# Patient Record
Sex: FEMALE | Race: WHITE | NOT HISPANIC OR LATINO | Employment: FULL TIME | URBAN - METROPOLITAN AREA
[De-identification: names, ages, dates, MRNs, and addresses within clinical notes are randomized per-mention and may not be internally consistent; named-entity substitution may affect disease eponyms.]

---

## 2017-06-22 ENCOUNTER — HOSPITAL ENCOUNTER (EMERGENCY)
Facility: HOSPITAL | Age: 52
Discharge: HOME | End: 2017-06-22
Attending: EMERGENCY MEDICINE | Admitting: EMERGENCY MEDICINE

## 2017-06-22 ENCOUNTER — APPOINTMENT (EMERGENCY)
Dept: CT IMAGING | Facility: HOSPITAL | Age: 52
End: 2017-06-22

## 2017-06-22 VITALS
RESPIRATION RATE: 19 BRPM | SYSTOLIC BLOOD PRESSURE: 144 MMHG | HEIGHT: 66 IN | DIASTOLIC BLOOD PRESSURE: 92 MMHG | BODY MASS INDEX: 22.5 KG/M2 | TEMPERATURE: 97.9 F | OXYGEN SATURATION: 100 % | HEART RATE: 73 BPM | WEIGHT: 140 LBS

## 2017-06-22 DIAGNOSIS — S09.90XA MINOR HEAD INJURY: Primary | ICD-10-CM

## 2017-06-22 DIAGNOSIS — F07.81 CONCUSSION SYNDROME: ICD-10-CM

## 2017-06-22 PROCEDURE — 70450 CT HEAD/BRAIN W/O DYE: CPT

## 2017-06-22 PROCEDURE — 99283 EMERGENCY DEPT VISIT LOW MDM: CPT

## 2024-10-13 ENCOUNTER — APPOINTMENT (EMERGENCY)
Dept: CT IMAGING | Facility: HOSPITAL | Age: 59
End: 2024-10-13
Payer: COMMERCIAL

## 2024-10-13 ENCOUNTER — HOSPITAL ENCOUNTER (EMERGENCY)
Facility: HOSPITAL | Age: 59
Discharge: HOME | End: 2024-10-13
Attending: EMERGENCY MEDICINE
Payer: COMMERCIAL

## 2024-10-13 VITALS
DIASTOLIC BLOOD PRESSURE: 100 MMHG | SYSTOLIC BLOOD PRESSURE: 217 MMHG | OXYGEN SATURATION: 99 % | WEIGHT: 162.04 LBS | TEMPERATURE: 97.7 F | HEART RATE: 61 BPM | BODY MASS INDEX: 26.15 KG/M2 | RESPIRATION RATE: 17 BRPM

## 2024-10-13 DIAGNOSIS — R11.2 NAUSEA AND VOMITING: ICD-10-CM

## 2024-10-13 DIAGNOSIS — E87.6 HYPOKALEMIA: ICD-10-CM

## 2024-10-13 DIAGNOSIS — F19.10 DRUG ABUSE (HCC): ICD-10-CM

## 2024-10-13 DIAGNOSIS — R10.9 ABDOMINAL PAIN: Primary | ICD-10-CM

## 2024-10-13 LAB
ALBUMIN SERPL BCG-MCNC: 4.5 G/DL (ref 3.5–5)
ALP SERPL-CCNC: 77 U/L (ref 34–104)
ALT SERPL W P-5'-P-CCNC: 18 U/L (ref 7–52)
ANION GAP SERPL CALCULATED.3IONS-SCNC: 10 MMOL/L (ref 4–13)
AST SERPL W P-5'-P-CCNC: 13 U/L (ref 13–39)
ATRIAL RATE: 64 BPM
BASOPHILS # BLD AUTO: 0.03 THOUSANDS/ΜL (ref 0–0.1)
BASOPHILS NFR BLD AUTO: 0 % (ref 0–1)
BILIRUB DIRECT SERPL-MCNC: 0.11 MG/DL (ref 0–0.2)
BILIRUB SERPL-MCNC: 1.01 MG/DL (ref 0.2–1)
BUN SERPL-MCNC: 20 MG/DL (ref 5–25)
CALCIUM SERPL-MCNC: 9.3 MG/DL (ref 8.4–10.2)
CHLORIDE SERPL-SCNC: 101 MMOL/L (ref 96–108)
CO2 SERPL-SCNC: 26 MMOL/L (ref 21–32)
CREAT SERPL-MCNC: 1.05 MG/DL (ref 0.6–1.3)
EOSINOPHIL # BLD AUTO: 0.03 THOUSAND/ΜL (ref 0–0.61)
EOSINOPHIL NFR BLD AUTO: 0 % (ref 0–6)
ERYTHROCYTE [DISTWIDTH] IN BLOOD BY AUTOMATED COUNT: 12.7 % (ref 11.6–15.1)
GFR SERPL CREATININE-BSD FRML MDRD: 59 ML/MIN/1.73SQ M
GLUCOSE SERPL-MCNC: 123 MG/DL (ref 65–140)
HCT VFR BLD AUTO: 40.9 % (ref 34.8–46.1)
HGB BLD-MCNC: 13.5 G/DL (ref 11.5–15.4)
IMM GRANULOCYTES # BLD AUTO: 0.06 THOUSAND/UL (ref 0–0.2)
IMM GRANULOCYTES NFR BLD AUTO: 0 % (ref 0–2)
LACTATE SERPL-SCNC: 1 MMOL/L (ref 0.5–2)
LIPASE SERPL-CCNC: 9 U/L (ref 11–82)
LYMPHOCYTES # BLD AUTO: 1.11 THOUSANDS/ΜL (ref 0.6–4.47)
LYMPHOCYTES NFR BLD AUTO: 8 % (ref 14–44)
MCH RBC QN AUTO: 29 PG (ref 26.8–34.3)
MCHC RBC AUTO-ENTMCNC: 33 G/DL (ref 31.4–37.4)
MCV RBC AUTO: 88 FL (ref 82–98)
MONOCYTES # BLD AUTO: 1.31 THOUSAND/ΜL (ref 0.17–1.22)
MONOCYTES NFR BLD AUTO: 9 % (ref 4–12)
NEUTROPHILS # BLD AUTO: 11.4 THOUSANDS/ΜL (ref 1.85–7.62)
NEUTS SEG NFR BLD AUTO: 83 % (ref 43–75)
NRBC BLD AUTO-RTO: 0 /100 WBCS
P AXIS: 39 DEGREES
PLATELET # BLD AUTO: 347 THOUSANDS/UL (ref 149–390)
PMV BLD AUTO: 8.7 FL (ref 8.9–12.7)
POTASSIUM SERPL-SCNC: 2.8 MMOL/L (ref 3.5–5.3)
PR INTERVAL: 138 MS
PROT SERPL-MCNC: 7.6 G/DL (ref 6.4–8.4)
QRS AXIS: 35 DEGREES
QRSD INTERVAL: 98 MS
QT INTERVAL: 456 MS
QTC INTERVAL: 470 MS
RBC # BLD AUTO: 4.65 MILLION/UL (ref 3.81–5.12)
SODIUM SERPL-SCNC: 137 MMOL/L (ref 135–147)
T WAVE AXIS: 19 DEGREES
VENTRICULAR RATE: 64 BPM
WBC # BLD AUTO: 13.94 THOUSAND/UL (ref 4.31–10.16)

## 2024-10-13 PROCEDURE — 36415 COLL VENOUS BLD VENIPUNCTURE: CPT | Performed by: EMERGENCY MEDICINE

## 2024-10-13 PROCEDURE — 96372 THER/PROPH/DIAG INJ SC/IM: CPT

## 2024-10-13 PROCEDURE — 96374 THER/PROPH/DIAG INJ IV PUSH: CPT

## 2024-10-13 PROCEDURE — 93005 ELECTROCARDIOGRAM TRACING: CPT

## 2024-10-13 PROCEDURE — 74177 CT ABD & PELVIS W/CONTRAST: CPT

## 2024-10-13 PROCEDURE — 83605 ASSAY OF LACTIC ACID: CPT | Performed by: EMERGENCY MEDICINE

## 2024-10-13 PROCEDURE — 85025 COMPLETE CBC W/AUTO DIFF WBC: CPT | Performed by: EMERGENCY MEDICINE

## 2024-10-13 PROCEDURE — 83690 ASSAY OF LIPASE: CPT | Performed by: EMERGENCY MEDICINE

## 2024-10-13 PROCEDURE — 96375 TX/PRO/DX INJ NEW DRUG ADDON: CPT

## 2024-10-13 PROCEDURE — 80076 HEPATIC FUNCTION PANEL: CPT | Performed by: EMERGENCY MEDICINE

## 2024-10-13 PROCEDURE — 96361 HYDRATE IV INFUSION ADD-ON: CPT

## 2024-10-13 PROCEDURE — 93010 ELECTROCARDIOGRAM REPORT: CPT | Performed by: STUDENT IN AN ORGANIZED HEALTH CARE EDUCATION/TRAINING PROGRAM

## 2024-10-13 PROCEDURE — 99285 EMERGENCY DEPT VISIT HI MDM: CPT | Performed by: EMERGENCY MEDICINE

## 2024-10-13 PROCEDURE — 99284 EMERGENCY DEPT VISIT MOD MDM: CPT

## 2024-10-13 PROCEDURE — 80048 BASIC METABOLIC PNL TOTAL CA: CPT | Performed by: EMERGENCY MEDICINE

## 2024-10-13 RX ORDER — PANTOPRAZOLE SODIUM 40 MG/10ML
40 INJECTION, POWDER, LYOPHILIZED, FOR SOLUTION INTRAVENOUS ONCE
Status: COMPLETED | OUTPATIENT
Start: 2024-10-13 | End: 2024-10-13

## 2024-10-13 RX ORDER — HALOPERIDOL 5 MG/ML
5 INJECTION INTRAMUSCULAR ONCE
Status: COMPLETED | OUTPATIENT
Start: 2024-10-13 | End: 2024-10-13

## 2024-10-13 RX ORDER — LORAZEPAM 2 MG/ML
1 INJECTION INTRAMUSCULAR ONCE
Status: COMPLETED | OUTPATIENT
Start: 2024-10-13 | End: 2024-10-13

## 2024-10-13 RX ORDER — LORAZEPAM 2 MG/ML
1 INJECTION INTRAMUSCULAR ONCE
Status: DISCONTINUED | OUTPATIENT
Start: 2024-10-13 | End: 2024-10-13

## 2024-10-13 RX ORDER — METOCLOPRAMIDE HYDROCHLORIDE 5 MG/ML
10 INJECTION INTRAMUSCULAR; INTRAVENOUS ONCE
Status: COMPLETED | OUTPATIENT
Start: 2024-10-13 | End: 2024-10-13

## 2024-10-13 RX ORDER — POTASSIUM CHLORIDE 1500 MG/1
40 TABLET, EXTENDED RELEASE ORAL ONCE
Status: COMPLETED | OUTPATIENT
Start: 2024-10-13 | End: 2024-10-13

## 2024-10-13 RX ADMIN — IOHEXOL 100 ML: 350 INJECTION, SOLUTION INTRAVENOUS at 06:19

## 2024-10-13 RX ADMIN — HALOPERIDOL LACTATE 5 MG: 5 INJECTION, SOLUTION INTRAMUSCULAR at 05:33

## 2024-10-13 RX ADMIN — PANTOPRAZOLE SODIUM 40 MG: 40 INJECTION, POWDER, FOR SOLUTION INTRAVENOUS at 07:11

## 2024-10-13 RX ADMIN — SODIUM CHLORIDE 1000 ML: 0.9 INJECTION, SOLUTION INTRAVENOUS at 05:33

## 2024-10-13 RX ADMIN — POTASSIUM CHLORIDE 40 MEQ: 1500 TABLET, EXTENDED RELEASE ORAL at 07:26

## 2024-10-13 RX ADMIN — LORAZEPAM 1 MG: 2 INJECTION INTRAMUSCULAR; INTRAVENOUS at 07:46

## 2024-10-13 RX ADMIN — METOCLOPRAMIDE 10 MG: 5 INJECTION, SOLUTION INTRAMUSCULAR; INTRAVENOUS at 07:11

## 2024-10-13 NOTE — ED NOTES
Spoke with Lindsay from [facility] who completed intake for pt for placement. Per Lindsay she will work on finding placement for pt and call back with further information. Provider aware, pt aware, pt resting at this time.      Heidi Ye, RN  10/13/24 7279

## 2024-10-13 NOTE — ED NOTES
"Pt ambulated to the bathroom with a steady gait but still complaining of being \"cold and stomach pain\". Urine sample at bedside. MD made aware.     Latonia Ramon RN  10/13/24 0642    "

## 2024-10-13 NOTE — ED PROVIDER NOTES
"Time reflects when diagnosis was documented in both MDM as applicable and the Disposition within this note       Time User Action Codes Description Comment    10/13/2024  7:02 AM Kristel Moreira Add [R10.9] Abdominal pain     10/13/2024  7:02 AM Kristel Moreira Add [R11.2] Nausea and vomiting     10/13/2024  7:02 AM Kristel Moreira Add [E87.6] Hypokalemia     10/13/2024  7:02 AM Kristel Moreira Add [F19.10] Drug abuse (HCC)     10/13/2024  7:02 AM Kristel Moreira Modify [F19.10] Drug abuse (HCC) multiple          ED Disposition       None          Assessment & Plan       Medical Decision Making  This is a 58-year-old female who presents here today with abdominal pain.  She says it has been present for about a week, generalized abdomen, feels like a cramping, nauseous sensation.  She was vomiting initially now is just dry heaving.  She denies any diarrhea.  She denies any urinary symptoms.  She has no fevers or URI symptoms.  She does endorse a history of drug abuse, using \"everything\" but says most recently was 1 to 2 weeks ago with the exception of marijuana that she used 2 days ago to help with her nausea. She denies any other recent drug use.   She says she usually snorts and denies injection of any drug.  She denies prior abdominal surgeries.  She said several years ago she had problems with recurrent abdominal pain that she was seen several times for and never given an answer.  She denies any other problems with chronic or recurrent abdominal pain.  She denies any underlying medical problems.  She was seen at [facility] ER on 10/10 with complaints of abdominal pain and reported last using heroin the day previously.  He was treated with multiple medications, and requested discharged after feeling better.  She had white count of 10.2, platelet count of 383, drug screen positive for cocaine, opiates, benzos.  She had a chest x-ray " done that showed no active disease.    Review of systems: Otherwise negative unless stated as above    She is anxious appearing, and frequently moving around in the bed.  She is occasionally yelling out in pain.  She has mild abdominal tenderness, but no peritoneal signs.  Exam is otherwise unremarkable.  Presentation of symptoms is concerning for opiate withdrawal, however she is adamant that she has not used anything in over a week, which would put her outside of the window for persistent significant symptoms from heroin.  Will therefore hold off on treatment with Suboxone.  She denies alcohol use, so withdrawal or complications of alcohol is not likely contributing to symptoms. She could have unrelated acute pancreatitis, cholelithiasis or cholecystitis, diverticulitis, colitis, enteritis, gastritis.  We we will check lab work and CT scan to evaluate, and treat her symptoms.  At this time, she is interested in help with rehab.    She has a mild leukocytosis of 13.94, which could be stress response.  Potassium is low at 2.8, so will be repleted.  Lab work is otherwise unremarkable.  CT scan shows no acute abnormalities.  She is having mild improvement of pain but says it is still persistent.  Nausea has improved.  We will give her additional medications to treat her symptoms, and sure that she is able to tolerate oral intake here.  She is still interested in help with drug use, so we will place a warm handoff.  Care transferred to Dr. Osorio pending warm handoff evaluation.    Problems Addressed:  Abdominal pain: acute illness or injury  Drug abuse (HCC): acute illness or injury  Hypokalemia: acute illness or injury  Nausea and vomiting: acute illness or injury    Amount and/or Complexity of Data Reviewed  Labs: ordered. Decision-making details documented in ED Course.  Radiology: ordered and independent interpretation performed. Decision-making details documented in ED Course.  ECG/medicine tests: ordered and  "independent interpretation performed. Decision-making details documented in ED Course.    Risk  Prescription drug management.             Medications   potassium chloride (Klor-Con M20) CR tablet 40 mEq (has no administration in time range)   haloperidol lactate (HALDOL) injection 5 mg (5 mg Intramuscular Given 10/13/24 0533)   sodium chloride 0.9 % bolus 1,000 mL (0 mL Intravenous Stopped 10/13/24 0641)   iohexol (OMNIPAQUE) 350 MG/ML injection (MULTI-DOSE) 100 mL (100 mL Intravenous Given 10/13/24 0619)   metoclopramide (REGLAN) injection 10 mg (10 mg Intravenous Given 10/13/24 0711)   pantoprazole (PROTONIX) injection 40 mg (40 mg Intravenous Given 10/13/24 0711)       ED Risk Strat Scores                           SBIRT 20yo+      Flowsheet Row Most Recent Value   Initial Alcohol Screen:  AUDIT-C     1. How often do you have a drink containing alcohol? 0 Filed at: 10/13/2024 0521   2. How many drinks containing alcohol do you have on a typical day you are drinking?  0 Filed at: 10/13/2024 0521   3b. FEMALE Any Age, or MALE 65+: How often do you have 4 or more drinks on one occassion? 0 Filed at: 10/13/2024 0521   Audit-C Score 0 Filed at: 10/13/2024 0521   GAGANDEEP: How many times in the past year have you...    Used an illegal drug or used a prescription medication for non-medical reasons? Daily or Almost Daily Filed at: 10/13/2024 0521   DAST-10: In the past 12 months...    1. Have you used drugs other than those required for medical reasons? 1 Filed at: 10/13/2024 0521   2. Do you use more than one drug at a time? 1 Filed at: 10/13/2024 0521   3. Have you had medical problems as a result of your drug use (e.g., memory loss, hepatitis, convulsions, bleeding, etc.)? 0 Filed at: 10/13/2024 0521   4. Have you had \"blackouts\" or \"flashbacks\" as a result of drug use?YesNo 0 Filed at: 10/13/2024 0521   5. Do you ever feel bad or guilty about your drug use? 0 Filed at: 10/13/2024 0521   6. Does your spouse (or parent) " "ever complain about your involvement with drugs? 0 Filed at: 10/13/2024 0521   7. Have you neglected your family because of your use of drugs? 0 Filed at: 10/13/2024 0521   9. Have you ever experienced withdrawal symptoms (felt sick) when you stopped taking drugs? 1 Filed at: 10/13/2024 0521   10. Are you always able to stop using drugs when you want to? 0 Filed at: 10/13/2024 0521                            History of Present Illness       Chief Complaint   Patient presents with    Abdominal Pain     Pt c/o abdominal that \"feels like I'm starving\" but nauseous. States it radiates up from the bottom of the stomach to epigastric. Per EMS patient was seen previously this week for withdrawals from medication they believe to be narcotics. Patient does not know what the medication was either. Patient states she has been able to drink and has been with water and Gatorade. Last BM was beginning of week per patient.        History reviewed. No pertinent past medical history.   History reviewed. No pertinent surgical history.   History reviewed. No pertinent family history.   Social History     Tobacco Use    Smoking status: Every Day   Substance Use Topics    Alcohol use: No    Drug use: Yes     Types: Marijuana     Comment: states she smoked two days ago to try and sleep      E-Cigarette/Vaping      E-Cigarette/Vaping Substances      I have reviewed and agree with the history as documented.       Abdominal Pain      Review of Systems   Gastrointestinal:  Positive for abdominal pain.           Objective       ED Triage Vitals [10/13/24 0511]   Temperature Pulse Blood Pressure Respirations SpO2 Patient Position - Orthostatic VS   97.7 °F (36.5 °C) 63 (!) 215/107 20 98 % Lying      Temp Source Heart Rate Source BP Location FiO2 (%) Pain Score    Oral Monitor Right arm -- --      Vitals      Date and Time Temp Pulse SpO2 Resp BP Pain Score FACES Pain Rating User   10/13/24 0640 -- 61 99 % 17 217/100 -- -- HE   10/13/24 0600 -- " 59 100 % 17 197/91 -- -- HE   10/13/24 0511 97.7 °F (36.5 °C) 63 98 % 20 215/107 -- -- HE            Physical Exam  Vitals and nursing note reviewed.   Constitutional:       General: She is not in acute distress.     Appearance: She is well-developed.      Comments: Uncomfortable appearing. Hypertensive. Intermittently yelling out in pain   HENT:      Head: Normocephalic and atraumatic.   Eyes:      Conjunctiva/sclera: Conjunctivae normal.      Pupils: Pupils are equal, round, and reactive to light.   Neck:      Trachea: No tracheal deviation.   Cardiovascular:      Rate and Rhythm: Normal rate and regular rhythm.      Heart sounds: Normal heart sounds.   Pulmonary:      Effort: Pulmonary effort is normal. No respiratory distress.      Breath sounds: Normal breath sounds.   Abdominal:      General: Bowel sounds are normal. There is no distension.      Palpations: Abdomen is soft.      Tenderness: There is generalized abdominal tenderness (mild). There is no guarding or rebound.   Musculoskeletal:      Cervical back: Normal range of motion.   Skin:     General: Skin is warm and dry.   Neurological:      Mental Status: She is alert and oriented to person, place, and time.      GCS: GCS eye subscore is 4. GCS verbal subscore is 5. GCS motor subscore is 6.   Psychiatric:         Mood and Affect: Mood and affect normal.         Behavior: Behavior is agitated.         Results Reviewed       Procedure Component Value Units Date/Time    Lactic acid, plasma (w/reflex if result > 2.0) [[id number]]  (Normal) Collected: 10/13/24 0532    Lab Status: Final result Specimen: Blood from Arm, Left Updated: 10/13/24 0559     LACTIC ACID 1.0 mmol/L     Narrative:      Result may be elevated if tourniquet was used during collection.    Basic metabolic panel [[id number]]  (Abnormal) Collected: 10/13/24 0532    Lab Status: Final result Specimen: Blood from Arm, Left Updated: 10/13/24 0559     Sodium 137 mmol/L      Potassium 2.8 mmol/L       Chloride 101 mmol/L      CO2 26 mmol/L      ANION GAP 10 mmol/L      BUN 20 mg/dL      Creatinine 1.05 mg/dL      Glucose 123 mg/dL      Calcium 9.3 mg/dL      eGFR 59 ml/min/1.73sq m     Narrative:      [employer] Kid[employer] guidelines for Chronic Kidney Disease (CKD):     Stage 1 with normal or high GFR (GFR > 90 mL/min/1.73 square meters)    Stage 2 Mild CKD (GFR = 60-89 mL/min/1.73 square meters)    Stage 3A Moderate CKD (GFR = 45-59 mL/min/1.73 square meters)    Stage 3B Moderate CKD (GFR = 30-44 mL/min/1.73 square meters)    Stage 4 Severe CKD (GFR = 15-29 mL/min/1.73 square meters)    Stage 5 End Stage CKD (GFR <15 mL/min/1.73 square meters)  Note: GFR calculation is accurate only with a steady state creatinine    Hepatic function panel [[id number]]  (Abnormal) Collected: 10/13/24 0532    Lab Status: Final result Specimen: Blood from Arm, Left Updated: 10/13/24 0559     Total Bilirubin 1.01 mg/dL      Bilirubin, Direct 0.11 mg/dL      Alkaline Phosphatase 77 U/L      AST 13 U/L      ALT 18 U/L      Total Protein 7.6 g/dL      Albumin 4.5 g/dL     Lipase [[id number]]  (Abnormal) Collected: 10/13/24 0532    Lab Status: Final result Specimen: Blood from Arm, Left Updated: 10/13/24 0559     Lipase 9 u/L     CBC and differential [[id number]]  (Abnormal) Collected: 10/13/24 0532    Lab Status: Final result Specimen: Blood from Arm, Left Updated: 10/13/24 0544     WBC 13.94 Thousand/uL      RBC 4.65 Million/uL      Hemoglobin 13.5 g/dL      Hematocrit 40.9 %      MCV 88 fL      MCH 29.0 pg      MCHC 33.0 g/dL      RDW 12.7 %      MPV 8.7 fL      Platelets 347 Thousands/uL      nRBC 0 /100 WBCs      Segmented % 83 %      Immature Grans % 0 %      Lymphocytes % 8 %      Monocytes % 9 %      Eosinophils Relative 0 %      Basophils Relative 0 %      Absolute Neutrophils 11.40 Thousands/µL      Absolute Immature Grans 0.06 Thousand/uL      Absolute Lymphocytes 1.11 Thousands/µL      Absolute Monocytes 1.31  Thousand/µL      Eosinophils Absolute 0.03 Thousand/µL      Basophils Absolute 0.03 Thousands/µL             CT abdomen pelvis with contrast   Final Interpretation by Raymond Eduardo MD (10/13 0704)      No acute findings in the abdomen or pelvis.         Workstation performed: [id number]             ECG 12 Lead Documentation Only    Date/Time: 10/13/2024 5:37 AM    Performed by: Kristel Moreira MD  Authorized by: Kristel Moreira MD    Indications / Diagnosis:  Abd pain, N/V  ECG reviewed by me, the ED Provider: yes    Patient location:  ED  Previous ECG:     Previous ECG:  Unavailable  Interpretation:     Interpretation: abnormal    Quality:     Tracing quality:  Limited by artifact  Rate:     ECG rate:  64    ECG rate assessment: normal    Rhythm:     Rhythm: sinus rhythm    Ectopy:     Ectopy: none    QRS:     QRS axis:  Normal    QRS intervals:  Normal  Conduction:     Conduction: normal    ST segments:     ST segments:  Normal  T waves:     T waves: flattening      Flattening:  V2  Other findings:     Other findings: poor R wave progression        ED Medication and Procedure Management   None     Patient's Medications    No medications on file     No discharge procedures on file.  ED SEPSIS DOCUMENTATION   Time reflects when diagnosis was documented in both MDM as applicable and the Disposition within this note       Time User Action Codes Description Comment    10/13/2024  7:02 AM Landy-Fahad Weiine Add [R10.9] Abdominal pain     10/13/2024  7:02 AM Landy-Fahad Weiine Add [R11.2] Nausea and vomiting     10/13/2024  7:02 AM Landy-Fahad Weiine Add [E87.6] Hypokalemia     10/13/2024  7:02 AM Landy-Fahad Weiine Add [F19.10] Drug abuse (HCC)     10/13/2024  7:02 AM Landy-Kristel Wei Modify [F19.10] Drug abuse (HCC) multiple                 Kristel Moreira MD  10/13/24 3631